# Patient Record
Sex: FEMALE | Race: WHITE | NOT HISPANIC OR LATINO | Employment: FULL TIME | URBAN - METROPOLITAN AREA
[De-identification: names, ages, dates, MRNs, and addresses within clinical notes are randomized per-mention and may not be internally consistent; named-entity substitution may affect disease eponyms.]

---

## 2019-09-13 ENCOUNTER — TELEPHONE (OUTPATIENT)
Dept: SCHEDULING | Facility: IMAGING CENTER | Age: 59
End: 2019-09-13

## 2019-09-19 ENCOUNTER — OFFICE VISIT (OUTPATIENT)
Dept: MEDICAL GROUP | Facility: PHYSICIAN GROUP | Age: 59
End: 2019-09-19

## 2019-09-19 VITALS
DIASTOLIC BLOOD PRESSURE: 84 MMHG | RESPIRATION RATE: 18 BRPM | HEIGHT: 63 IN | OXYGEN SATURATION: 94 % | TEMPERATURE: 98.6 F | WEIGHT: 115.2 LBS | SYSTOLIC BLOOD PRESSURE: 128 MMHG | BODY MASS INDEX: 20.41 KG/M2 | HEART RATE: 76 BPM

## 2019-09-19 DIAGNOSIS — D22.9 ATYPICAL MOLE: ICD-10-CM

## 2019-09-19 DIAGNOSIS — Z00.00 HEALTH CARE MAINTENANCE: ICD-10-CM

## 2019-09-19 PROCEDURE — 99204 OFFICE O/P NEW MOD 45 MIN: CPT | Performed by: NURSE PRACTITIONER

## 2019-09-19 SDOH — HEALTH STABILITY: MENTAL HEALTH: HOW OFTEN DO YOU HAVE A DRINK CONTAINING ALCOHOL?: MONTHLY OR LESS

## 2019-09-19 SDOH — HEALTH STABILITY: MENTAL HEALTH: HOW MANY STANDARD DRINKS CONTAINING ALCOHOL DO YOU HAVE ON A TYPICAL DAY?: 1 OR 2

## 2019-09-19 ASSESSMENT — ENCOUNTER SYMPTOMS
BLOOD IN STOOL: 0
CHILLS: 0
HEADACHES: 0
ABDOMINAL PAIN: 0
BLURRED VISION: 0
PALPITATIONS: 0
DIZZINESS: 0
SHORTNESS OF BREATH: 0
FEVER: 0

## 2019-09-19 ASSESSMENT — PATIENT HEALTH QUESTIONNAIRE - PHQ9: CLINICAL INTERPRETATION OF PHQ2 SCORE: 0

## 2019-09-19 NOTE — LETTER
[facility]  RAUL Helton  [address] [facility] [address]  [facility] [address]  Fax: [phone]   Authorization for Release/Disclosure of   Protected Health Information   Name: GRACE FLORES : 1960 SSN: [id number]   Address: [address] [place] [address] [place] [address] Phone:    [phone] (home)    I authorize the entity listed below to release/disclose the PHI below to:   [facility]/RAUL Helton and RAUL Helton   Provider or Entity Name:     Address   City, State, Zip   Phone:      Fax:     Reason for request: continuity of care   Information to be released:    [  ] LAST COLONOSCOPY,  including any PATH REPORT and follow-up  [  ] LAST FIT/COLOGUARD RESULT [  ] LAST DEXA  [  ] LAST MAMMOGRAM  [  ] LAST PAP  [  ] LAST LABS [  ] RETINA EXAM REPORT  [  ] IMMUNIZATION RECORDS  [  ] Release all info      [  ] Check here and initial the line next to each item to release ALL health information INCLUDING  _____ Care and treatment for drug and / or alcohol abuse  _____ HIV testing, infection status, or AIDS  _____ Genetic Testing    DATES OF SERVICE OR TIME PERIOD TO BE DISCLOSED: _____________  I understand and acknowledge that:  * This Authorization may be revoked at any time by you in writing, except if your health information has already been used or disclosed.  * Your health information that will be used or disclosed as a result of you signing this authorization could be re-disclosed by the recipient. If this occurs, your re-disclosed health information may no longer be protected by State or Federal laws.  * You may refuse to sign this Authorization. Your refusal will not affect your ability to obtain treatment.  * This Authorization becomes effective upon signing and will  on (date) __________.      If no date is indicated, this Authorization will  one (1) year from the signature date.    Name: Grace Flores    Signature:   Date:     2019            PLEASE FAX REQUESTED RECORDS BACK TO: [phone]

## 2019-09-19 NOTE — ASSESSMENT & PLAN NOTE
Reports new mole located R upper chest that has been present for one month. She denies evolution of mole, including color, shape, or size. She does note that lesion is occasionally itchy.  She does report FH of melanoma (two of her sisters).

## 2019-09-19 NOTE — PROGRESS NOTES
New Patient appointment    Maria Esther Aly is a 58 y.o. female here for c/o of mole. She intends to establish care with provider in  [facility]. Her previous PCP was located [place]. She presents with the following concerns:    HPI:      Atypical mole  Reports new mole located R upper chest that has been present for one month. She denies evolution of mole, including color, shape, or size. She does note that lesion is occasionally itchy.  She does report FH of melanoma (two of her sisters).     Health Maintenance:  She is not interested in any preventative screening today.    Current medicines (including changes today)  No current outpatient medications on file.     No current facility-administered medications for this visit.      She  has a past medical history of H/O bilateral breast implants.  She  has a past surgical history that includes breast implant revision (Bilateral).  Social History     Tobacco Use   • Smoking status: Never Smoker   • Smokeless tobacco: Never Used   Substance Use Topics   • Alcohol use: Yes     Frequency: Monthly or less     Drinks per session: 1 or 2   • Drug use: Not Currently     Social History     Social History Narrative   • Not on file     Family History   Problem Relation Age of Onset   • Stroke Mother    • Hypertension Mother    • Lung Disease Father    • Cancer Sister         Melanoma     No family status information on file.         Review of Systems   Constitutional: Negative for chills, fever and malaise/fatigue.   HENT: Negative for hearing loss.    Eyes: Negative for blurred vision.        She wears corrective glasses   Respiratory: Negative for shortness of breath.    Cardiovascular: Negative for chest pain and palpitations.   Gastrointestinal: Negative for abdominal pain and blood in stool.   Skin: Positive for itching.   Neurological: Negative for dizziness and headaches.       All other systems reviewed and are negative    Depression Screening    Little interest or  "pleasure in doing things?  0 - not at all  Feeling down, depressed , or hopeless? 0 - not at all  Patient Health Questionnaire Score: 0    If depressive symptoms identified deferred to follow up visit unless specifically addressed in assesment and plan.      Interpretation of PHQ-9 Total Score   Score Severity   1-4 Minimal Depression   5-9 Mild Depression   10-14 Moderate Depression   15-19 Moderately Severe Depression   20-27 Severe Depression         Objective:     /84   Pulse 76   Temp 37 °C (98.6 °F) (Temporal)   Resp 18   Ht 1.6 m (5' 3\")   Wt 52.3 kg (115 lb 3.2 oz)   SpO2 94%  Body mass index is 20.41 kg/m².    Physical Exam:  Constitutional: Oriented to person, place, and time and well-developed, well-nourished, and in no distress.   HENT:   Head: Normocephalic and atraumatic.   Right Ear: Tympanic membrane and external ear normal.   Left Ear: Tympanic membrane and external ear normal.   Mouth/Throat: Oropharynx is clear and moist and mucous membranes are normal. No oropharyngeal exudate or posterior oropharyngeal erythema.   Eyes: Conjunctivae and EOM are normal. Pupils are equal, round, and reactive to light.   Neck: Normal range of motion. Neck supple. No thyromegaly present.   Cardiovascular: Normal rate, regular rhythm, normal heart sounds. Radial pulses intact. Exam reveals no friction rub. No murmur heard.  Pulmonary/Chest: Effort normal and breath sounds normal. No respiratory distress or use of accessory muscles. No wheezes, rhonchi, or rales.   Abdominal: Soft. Bowel sounds are normal. Exhibits no distension and no mass. There is no tenderness. No hepatosplenomegaly.    Musculoskeletal: Full range of motion. No deformity or swelling of joints. DTRs intact.   Neurological: Alert and oriented to person, place, and time. Gait normal.   Skin: Skin is warm and dry. No cyanosis. No edema. Nevus approx 0.3 cm in diameter, round, flesh colored, rough texture located R upper " chest.  Psychiatric: Mood, memory, affect and judgment normal.     Assessment and Plan:   The following treatment plan was discussed    1. Atypical mole  Lesion does resemble basal cell carcinoma, however I cannot r/o benign lesion. She was referred to dermatology for further evaluation and management.  - REFERRAL TO DERMATOLOGY    2. Health care maintenance  Strongly encouraged to schedule follow up appointment to address preventative screenings, including Pap smear, colonoscopy, and mammogram.     Records requested.    Followup: Return if symptoms worsen or fail to improve.

## 2019-09-19 NOTE — LETTER
[facility]  RAUL Helton  [address] [facility] [address]  [facility] [address]  Fax: [phone]   Authorization for Release/Disclosure of   Protected Health Information   Name: GRACE FLORES : 1960 SSN: [id number]   Address: [address] [place] [address] [place] [address] Phone:    [phone] (home)    I authorize the entity listed below to release/disclose the PHI below to:   [facility]RAUL Helton and RAUL Helton   Provider or Entity Name:     Address   City, State, Zip   Phone:      Fax:     Reason for request: continuity of care   Information to be released:    [  ] LAST COLONOSCOPY,  including any PATH REPORT and follow-up  [  ] LAST FIT/COLOGUARD RESULT [  ] LAST DEXA  [  ] LAST MAMMOGRAM  [  ] LAST PAP  [  ] LAST LABS [  ] RETINA EXAM REPORT  [  ] IMMUNIZATION RECORDS  [  ] Release all info      [  ] Check here and initial the line next to each item to release ALL health information INCLUDING  _____ Care and treatment for drug and / or alcohol abuse  _____ HIV testing, infection status, or AIDS  _____ Genetic Testing    DATES OF SERVICE OR TIME PERIOD TO BE DISCLOSED: _____________  I understand and acknowledge that:  * This Authorization may be revoked at any time by you in writing, except if your health information has already been used or disclosed.  * Your health information that will be used or disclosed as a result of you signing this authorization could be re-disclosed by the recipient. If this occurs, your re-disclosed health information may no longer be protected by State or Federal laws.  * You may refuse to sign this Authorization. Your refusal will not affect your ability to obtain treatment.  * This Authorization becomes effective upon signing and will  on (date) __________.      If no date is indicated, this Authorization will  one (1) year from the signature date.    Name: Grace Flores    Signature:   Date:     2019            PLEASE FAX REQUESTED RECORDS BACK TO: [phone]

## 2021-05-06 ENCOUNTER — HOSPITAL ENCOUNTER (OUTPATIENT)
Dept: RADIOLOGY | Facility: MEDICAL CENTER | Age: 61
End: 2021-05-06
Attending: PHYSICIAN ASSISTANT

## 2021-05-06 ENCOUNTER — OFFICE VISIT (OUTPATIENT)
Dept: URGENT CARE | Facility: PHYSICIAN GROUP | Age: 61
End: 2021-05-06

## 2021-05-06 VITALS
SYSTOLIC BLOOD PRESSURE: 122 MMHG | HEART RATE: 88 BPM | TEMPERATURE: 98.7 F | RESPIRATION RATE: 18 BRPM | HEIGHT: 63 IN | BODY MASS INDEX: 20.41 KG/M2 | DIASTOLIC BLOOD PRESSURE: 82 MMHG | OXYGEN SATURATION: 97 %

## 2021-05-06 DIAGNOSIS — M25.562 ACUTE PAIN OF LEFT KNEE: ICD-10-CM

## 2021-05-06 PROCEDURE — 99213 OFFICE O/P EST LOW 20 MIN: CPT | Performed by: PHYSICIAN ASSISTANT

## 2021-05-06 PROCEDURE — 73564 X-RAY EXAM KNEE 4 OR MORE: CPT | Mod: LT

## 2021-05-09 ASSESSMENT — ENCOUNTER SYMPTOMS
SEIZURES: 0
TINGLING: 0
SHORTNESS OF BREATH: 0
BLURRED VISION: 0
SPEECH CHANGE: 0
SENSORY CHANGE: 0
TREMORS: 0
CLAUDICATION: 0
DIARRHEA: 0
LOSS OF CONSCIOUSNESS: 0
DIZZINESS: 0
ORTHOPNEA: 0
PALPITATIONS: 0
FOCAL WEAKNESS: 0
DOUBLE VISION: 0

## 2021-05-09 NOTE — PROGRESS NOTES
"Subjective:   Maria Esther Aly is a 60 y.o. female who presents for Knee Pain (left, cant move or bend, x2 hours)      Pt is a 60-year-old female who presents for evaluation of a left knee injury.  Patient states the mechanism of injury occurred with a fall.  Pain is located medial side of left knee and is characterized as sharp.   Pt denies previous injury/surgery to the affected area.  PMH unremarkable.  Last meal approximately less than 6 hrs ago.       Review of Systems   Eyes: Negative for blurred vision and double vision.   Respiratory: Negative for shortness of breath.    Cardiovascular: Negative for palpitations, orthopnea, claudication and leg swelling.   Gastrointestinal: Negative for diarrhea.   Musculoskeletal: Positive for joint pain.   Neurological: Negative for dizziness, tingling, tremors, sensory change, speech change, focal weakness, seizures and loss of consciousness.   All other systems reviewed and are negative.      Medications:    • This patient does not have an active medication from one of the medication groupers.    Allergies: Patient has no known allergies.    Problem List: Maria Esther Aly has Atypical mole on their problem list.    Surgical History:  Past Surgical History:   Procedure Laterality Date   • BREAST IMPLANT REVISION Bilateral        Past Social Hx: Maria Esther Aly  reports that she has never smoked. She has never used smokeless tobacco. She reports current alcohol use. She reports previous drug use.     Past Family Hx:  Maria Esther Aly family history includes Cancer in her sister; Hypertension in her mother; Lung Disease in her father; Stroke in her mother.     Problem list, medications, and allergies reviewed by myself today in Epic.     Objective:     Blood Pressure 122/82 (BP Location: Left arm, Patient Position: Sitting, BP Cuff Size: Adult)   Pulse 88   Temperature 37.1 °C (98.7 °F) (Temporal)   Respiration 18   Height 1.6 m (5' 3\")   Oxygen Saturation 97%   Body " Mass Index 20.41 kg/m²     Physical Exam  Vitals reviewed.   Constitutional:       Appearance: She is well-developed.   Cardiovascular:      Rate and Rhythm: Normal rate and regular rhythm.      Pulses: Normal pulses.           Popliteal pulses are 2+ on the right side and 2+ on the left side.        Dorsalis pedis pulses are 2+ on the right side and 2+ on the left side.      Heart sounds: Normal heart sounds.   Pulmonary:      Effort: Pulmonary effort is normal.      Breath sounds: Normal breath sounds.   Musculoskeletal:         General: Tenderness present. No swelling or deformity.      Cervical back: Normal range of motion and neck supple.      Comments: PTP of the medial knee.  Limited ROM with active movement.  No pain with passive movement.  Pt able to squat.  No palpable effusion/bursa.  Patella is midline. Extremity compartments soft.      Special Tests:   Varus/Valgus: NEG  Lachman: NEG  Posterior Drawer: NEG   Claudio: NEG   Skin:     General: Skin is warm and dry.      Capillary Refill: Capillary refill takes less than 2 seconds.      Findings: No erythema.   Neurological:      General: No focal deficit present.      Mental Status: She is alert and oriented to person, place, and time. Mental status is at baseline.      Sensory: No sensory deficit.      Gait: Gait abnormal.      Comments: Antalgic gait present.   Psychiatric:         Mood and Affect: Mood normal.         Behavior: Behavior normal.         Thought Content: Thought content normal.         Judgment: Judgment normal.         RADIOLOGY RESULTS   DX-KNEE COMPLETE 4+ LEFT    Result Date: 5/6/2021 5/6/2021 6:34 PM HISTORY/REASON FOR EXAM:  Pain/Deformity Following Trauma Left knee pain. TECHNIQUE/EXAM DESCRIPTION AND NUMBER OF VIEWS:  4 views of the LEFT knee. COMPARISON: None FINDINGS: No acute fracture or dislocation. Mild tricompartmental osteoarthritis, most in the lateral compartment and patellofemoral compartment No knee joint effusion.      1. No acute osseous abnormality.           Assessment/Plan:     Medical Decision Making/Comments     Pt is a 60-year-old female who presents for evaluation of a left knee injury.   Exam shows PTP of the medial side of the left knee with no PTP of the joint above or below.  There is decreased ROM and muscle strength compared to the unaffected limb.  Distal neurovascular status is intact with soft compartments.  Skin is intact with mild swelling and without bruising.  X-rays are negative for fracture by mine and radiologist read.  No red flag findings on exam.       Diagnosis and associated orders     1. Acute pain of left knee  DX-KNEE COMPLETE 4+ LEFT       -Protection/compression  -Rest: activity as tolerated  -Ice: Ice first 3-7 days.  20 min off/on  -Elevation  -NSAIDs/Acetomenophen as needed           Differential diagnosis, natural history, supportive care, and indications for immediate follow-up discussed.    Advised the patient to follow-up with the primary care physician for recheck, reevaluation, and consideration of further management.    Please note that this dictation was created using voice recognition software. I have made a reasonable attempt to correct obvious errors, but I expect that there are errors of grammar and possibly content that I did not discover before finalizing the note.

## 2022-09-08 ENCOUNTER — OFFICE VISIT (OUTPATIENT)
Dept: DERMATOLOGY | Facility: IMAGING CENTER | Age: 62
End: 2022-09-08

## 2022-09-08 DIAGNOSIS — T14.8XXA FRICTION BLISTER: ICD-10-CM

## 2022-09-08 PROCEDURE — 99212 OFFICE O/P EST SF 10 MIN: CPT | Performed by: NURSE PRACTITIONER

## 2022-09-08 NOTE — PROGRESS NOTES
DERMATOLOGY NOTE  NEW VISIT       Chief complaint: Skin Lesion     HPI: mole   Location:  left foot , second toe   Time present: 3 days ago, after hiking  Painful lesion: No  Itching lesion: No  Enlarging lesion: No  Anything make it better or worse?no, but does note is has gotten smaller          History of skin cancer: No  History of precancers/actinic keratoses: Yes, Details:    History of biopsies:No  History of blistering/severe sunburns:No  Family history of skin cancer:Yes, Details: sister melanoma   Family history of atypical moles:No      No Known Allergies     MEDICATIONS:  Medications relevant to specialty reviewed.     REVIEW OF SYSTEMS:   Positive for skin (see HPI)  Negative for fevers and chills       EXAM:  There were no vitals taken for this visit.  Constitutional: Well-developed, well-nourished, and in no distress.     A focused skin exam was performed including the affected areas of the L foot. Notable findings on exam today listed below and/or in assessment/plan.     5 mm erythematous/brownish fluid filled papule to lateral aspect of L second toe    IMPRESSION / PLAN:    1. Friction blister--favored diagnosis    Discussed with pt likely blister (blood blister) from recent hike, especially as pt notes it has gotten smaller  However, advised to follow up in 6-8 weeks for PATITO and for spot check     Patient verbalized understanding and agrees with plan regarding the above              Please note that this dictation was created using voice recognition software. I have made every reasonable attempt to correct obvious errors, but I expect that there are errors of grammar and possibly content that I did not discover before finalizing the note.      Return to clinic in: Return for 6-8 weeks, PATITO, follow up lesion on toe. and as needed for any new or changing skin lesions.